# Patient Record
Sex: MALE | Race: BLACK OR AFRICAN AMERICAN
[De-identification: names, ages, dates, MRNs, and addresses within clinical notes are randomized per-mention and may not be internally consistent; named-entity substitution may affect disease eponyms.]

---

## 2017-11-09 NOTE — PD
HPI


Chief Complaint:   Complaint


Time Seen by Provider:  15:29


Travel History


International Travel<30 days:  No


Contact w/Intl Traveler<30days:  No


Traveled to known affect area:  No





History of Present Illness


HPI


31-year-old male presents to emergency room with wounds on his penis for 

approximately 2 weeks.  States that he had unprotected oral sex approximately 2 

weeks ago and started developing these lesions.  States one lesion has healed 

however, there is another sore that is open and painful in the anterior portion 

of his penile shaft that has been present for approximately one week.  States 

he is also having some right groin pain with a lump.  Patient has one partner 

who was not a sex worker.  Patient knows his partner well.  Patient denies 

history of STDs previously.  Denies pain with ejaculation or urination.  Denies 

testicular pain.  Denies penile discharge.


Patient denies fever, chills, radiation of pain, chest pain, shortness of breath

, abdominal pain.  Patient denies any medical issues or chronic medication use.





PFSH


Social History


Tobacco Use:  No





Allergies-Medications


(Allergen,Severity, Reaction):  


Coded Allergies:  


     No Known Allergies (Unverified , 11/9/17)


Reported Meds & Prescriptions





Reported Meds & Active Scripts


Active


Acyclovir 400 Mg Tab 400 Mg PO TID 7 Days








Review of Systems


Except as stated in HPI:  all other systems reviewed are Neg





Physical Exam


Narrative


GENERAL: Well-nourished, well-developed patient.


SKIN: Focused skin assessment warm/dry.


HEAD: Normocephalic.


EYES: No scleral icterus. No injection or drainage. 


NECK: Supple, trachea midline. No JVD or lymphadenopathy.


CARDIOVASCULAR: Regular rate and rhythm without murmurs, gallops, or rubs. 


RESPIRATORY: Breath sounds equal bilaterally. No accessory muscle use.


GASTROINTESTINAL: Abdomen soft, non-tender, nondistended. 


-  (examined with chaperone in the room)1cm round ulcerated on anterior 

penile shaft, 3mm healing papule just proximal to larger ulcer. No exudate or 

discharge from penis or ulcers.Testicles non edematous. BL groin with mild 

lymphadenopathy, TTP to right groin.


MUSCULOSKELETAL: No cyanosis, or edema. 


BACK: Nontender without obvious deformity. No CVA tenderness.





Data


Data


Last Documented VS





Vital Signs








  Date Time  Temp Pulse Resp B/P (MAP) Pulse Ox O2 Delivery O2 Flow Rate FiO2


 


11/9/17 14:05 98.2 113 16 149/73 (98) 99 Room Air  








Orders





 Orders


Ua Includes Microscopic (11/9/17 14:21)


Gc And Chlamydia Pcr (11/9/17 14:21)


Rpr With Reflex To Fta Abs (11/9/17 15:42)


Wound Culture And Gram Stain (11/9/17 15:46)


Penicillin G Benzathine Inj (Bicillin L- (11/9/17 16:00)


Ed Discharge Order (11/9/17 15:56)





Labs





Laboratory Tests








Test


  11/9/17


14:20 11/9/17


15:25 11/9/17


15:45


 


Urine Color YELLOW   


 


Urine Turbidity CLEAR   


 


Urine pH 6.5   


 


Urine Specific Gravity 1.025   


 


Urine Protein TRACE mg/dL   


 


Urine Glucose (UA) NEG mg/dL   


 


Urine Ketones NEG mg/dL   


 


Urine Occult Blood NEG   


 


Urine Nitrite NEG   


 


Urine Bilirubin NEG   


 


Urine Urobilinogen 2.0 MG/DL   


 


Urine Leukocyte Esterase NEG   


 


Urine RBC 1 /hpf   


 


Urine WBC 1 /hpf   


 


Urine Mucus FEW /lpf   











MDM


Medical Decision Making


Medical Screen Exam Complete:  Yes


Emergency Medical Condition:  Yes


Differential Diagnosis


penile ulcer vs syphilis vs genital herpes


Narrative Course


31-year-old male presents to emergency room with wounds on his penis for 

approximately 2 weeks.  States that he had unprotected oral sex approximately 2 

weeks ago and started developing these lesions.  States one lesion has healed 

however, there is another sore that is open and painful in the anterior portion 

of his penile shaft that has been present for approximately one week.  States 

he is also having some right groin pain with a lump.  Patient has one partner 

who was not a sex worker.  Patient knows his partner well.  Patient denies 

history of STDs previously.  Denies pain with ejaculation or urination.  Denies 

testicular pain.  Denies penile discharge.


Patient denies fever, chills, radiation of pain, chest pain, shortness of breath

, abdominal pain.  Patient denies any medical issues or chronic medication use.


Physical exam demonstrates a 1 cm round painful ulcer of the anterior penal 

shaft.  No penile discharge or exudate.  Right groin lymphadenopathy.  No 

testicular involvement, edema or TTP


Treat prophylactically for syphilis.  Culture sent.


Although not likely based off of signs and symptoms, we'll treat for genital 

herpes as well.  Patient in Band-Aid over the ulcer and I am concerned that 

this is irritated the lesion.  Concerned about compliance with follow-up.


Patient strongly advised to follow-up with the health department or primary 

care physician for further evaluation including testing of HIV.


Return to the ED for worsening symptoms





Diagnosis





 Primary Impression:  


 Penile ulcer


Referrals:  


[facility]





***Additional Instructions:  


Follow-up with the health Department within 2 days.


Take medications as prescribed


If her symptoms worsen or persists return to the emergency department


Avoid penile contact for at least one week.


Use condoms when sexually active


Medications as prescribed


Scripts


Acyclovir (Acyclovir) 400 Mg Tab


400 MG PO TID for Mgmt Viral Infection for 7 Days, TAB 0 Refills


   Prov: Forrest Paris MD         11/9/17


Disposition:  01 DISCHARGE HOME


Condition:  Stable











Pricila Smith Nov 9, 2017 15:41

## 2018-05-16 ENCOUNTER — HOSPITAL ENCOUNTER (EMERGENCY)
Dept: HOSPITAL 17 - NEPD | Age: 33
LOS: 1 days | Discharge: HOME | End: 2018-05-17
Payer: SELF-PAY

## 2018-05-16 VITALS
HEART RATE: 98 BPM | OXYGEN SATURATION: 99 % | DIASTOLIC BLOOD PRESSURE: 84 MMHG | SYSTOLIC BLOOD PRESSURE: 161 MMHG | RESPIRATION RATE: 16 BRPM | TEMPERATURE: 99.1 F

## 2018-05-16 DIAGNOSIS — N48.1: Primary | ICD-10-CM

## 2018-05-16 DIAGNOSIS — N34.2: ICD-10-CM

## 2018-05-16 DIAGNOSIS — F17.210: ICD-10-CM

## 2018-05-16 LAB
COLOR UR: YELLOW
GLUCOSE UR STRIP-MCNC: (no result) MG/DL
HGB UR QL STRIP: (no result)
KETONES UR STRIP-MCNC: (no result) MG/DL
MUCOUS THREADS #/AREA URNS LPF: (no result) /LPF
NITRITE UR QL STRIP: (no result)
SP GR UR STRIP: 1.02 (ref 1–1.03)
URINE LEUKOCYTE ESTERASE: (no result)

## 2018-05-16 PROCEDURE — 87591 N.GONORRHOEAE DNA AMP PROB: CPT

## 2018-05-16 PROCEDURE — 87491 CHLMYD TRACH DNA AMP PROBE: CPT

## 2018-05-16 PROCEDURE — 81001 URINALYSIS AUTO W/SCOPE: CPT

## 2018-05-16 PROCEDURE — 99283 EMERGENCY DEPT VISIT LOW MDM: CPT

## 2018-05-16 PROCEDURE — 96372 THER/PROPH/DIAG INJ SC/IM: CPT

## 2018-05-16 NOTE — PD
Data


Data


Last Documented VS





Vital Signs








  Date Time  Temp Pulse Resp B/P (MAP) Pulse Ox O2 Delivery O2 Flow Rate FiO2


 


5/16/18 21:24 99.1 98 16 161/84 (109) 99   








Orders





 Orders


Urinalysis - C+S If Indicated (5/16/18 22:48)


Gc And Chlamydia Pcr (5/16/18 22:48)


Ceftriaxone Inj (Rocephin Inj) (5/16/18 23:30)


Lidocaine 1% Inj (50 Ml) (Xylocaine 1% I (5/16/18 23:30)


Azithromycin (Zithromax) (5/16/18 23:30)





Labs





Laboratory Tests








Test


  5/16/18


23:03


 


Urine Color YELLOW 


 


Urine Turbidity CLEAR 


 


Urine pH 7.0 


 


Urine Specific Gravity 1.024 


 


Urine Protein TRACE mg/dL 


 


Urine Glucose (UA) NEG mg/dL 


 


Urine Ketones NEG mg/dL 


 


Urine Occult Blood NEG 


 


Urine Nitrite NEG 


 


Urine Bilirubin NEG 


 


Urine Urobilinogen 4.0 MG/DL 


 


Urine Leukocyte Esterase NEG 


 


Urine RBC


  LESS THAN 1


/hpf


 


Urine WBC 1 /hpf 


 


Urine Mucus FEW /lpf 


 


Microscopic Urinalysis Comment


  CULT NOT


INDICATED











MDM


Supervised Visit with MADALYN:  No


Narrative Course


I, Dr. Vyas, have reviewed the advance practice practitioner's documentation 

and am in agreement, met with the patient face to face, made the diagnosis, and 

the medical decision making was done by me.  See her note for further details.  

The patient prefers to have a male examiner.





Briefly this is a 32-year-old male who presents for evaluation of redness and 

irritation at the tip of his penis.  He first noticed area of irritation about 

a week ago and he states he became sensitive today.  He reports being bisexual, 

but states that he has not had any sexual activity in a little over 2 weeks.  

Patient was seen here in the past and reportedly had an ulcer on his penis that 

resolved with acyclovir.  He has not been formally diagnosed with genital 

herpes.  He has had some dysuria.  BGL here is 87.  On exam the patient is 

circumcised with some erythema and crustiness/ erythema around the distal 

urethra.  There are no ulcerative lesions.  The rest of his  exam is within 

normal limits without masses or swelling.  Testicles are nontender.  No 

inguinal lymphadenopathy.  UA shows few mucus, not suggestive of UTI.  Patient 

will be empirically treated for gonorrhea and chlamydia with IM Rocephin and 

oral azithromycin.  He will also be started on acyclovir as well as 

clotrimazole ointment.


Diagnosis





 Primary Impression:  


 Balanitis


 Additional Impression:  


 Urethritis


Referrals:  


Levar Ramos MD


3 days





Primary Care Physician


3 days





***Additional Instruction:  


Follow-up with a primary care physician this week.


Follow-up with urologist Dr. Ramos or a urologist of your choice this week.


Return to the emergency department for worsening symptoms or any other concerns.


Scripts


Clotrimazole Topical (Clotrimazole AF Topical) 1% Cream


1 APPLIC TOPICAL BID for Infection for 7 Days, #15 GM 0 Refills


   Prov: Rios Vyas MD         5/16/18 


Acyclovir (Acyclovir) 800 Mg Tab


800 MG PO 5 TIMES A DAY for Mgmt Viral Infection for 5 Days, TAB 0 Refills


   Prov: Rios Vyas MD         5/16/18


Disposition:  01 DISCHARGE HOME


Condition:  Stable











Rios Vyas MD May 16, 2018 23:38

## 2018-05-16 NOTE — PD
HPI


Chief Complaint:   Complaint


Time Seen by Provider:  22:41


Travel History


International Travel<30 days:  No


Contact w/Intl Traveler<30days:  No


Traveled to known affect area:  No





History of Present Illness


HPI


Patient is a 32-year-old male presenting to the emergency department for 

evaluation of irritation to the tip of his penis.  Patient states this started 

1 week ago.  He has had sex in the last month but denies any unprotected sex.  

He denies any dysuria, fever, chills, abdominal pain.  He states the tip of his 

penis is swollen.  Pain is worse when it touches anything or he touches it.  

Symptom onset was gradual, symptoms are moderate to severe nature.  There are 

no alleviating factors.  Patient reports the pain is a 7 out of 10.





PFSH


Past Medical History


Medical History:  Denies Significant Hx


Diminished Hearing:  No


Immunizations Current:  Yes





Past Surgical History


Other Surgery:  Yes (ABSCESS)





Social History


Alcohol Use:  Yes


Tobacco Use:  Yes (1/2PPD)


Substance Use:  No





Allergies-Medications


(Allergen,Severity, Reaction):  


Coded Allergies:  


     No Known Allergies (Unverified , 11/9/17)


Reported Meds & Prescriptions





Reported Meds & Active Scripts


Active


Clotrimazole AF Topical (Clotrimazole) 1% Cream 1 Applic TOPICAL BID 7 Days


Acyclovir 800 Mg Tab 800 Mg PO 5 TIMES A DAY 5 Days


Acyclovir 400 Mg Tab 400 Mg PO TID 7 Days








Review of Systems


Except as stated in HPI:  all other systems reviewed are Neg


Genitourinary:  Positive: Other (Penis pain)





Physical Exam


Narrative


GENERAL: Well developed, well-nourished, alert -American male.  

Presenting in no acute distress.


SKIN: Warm and dry.


HEAD: Normocephalic.


EYES: No scleral icterus. No injection or drainage. 


NECK: Supple, trachea midline. No JVD or lymphadenopathy.


CARDIOVASCULAR: Regular rate and rhythm without murmurs, gallops, or rubs. 


RESPIRATORY: Breath sounds equal bilaterally. No accessory muscle use.


GASTROINTESTINAL: Abdomen soft, non-tender, nondistended. 


MUSCULOSKELETAL: No cyanosis, or edema. 


BACK: Nontender without obvious deformity. No CVA tenderness.








Data


Data


Last Documented VS





Vital Signs








  Date Time  Temp Pulse Resp B/P (MAP) Pulse Ox O2 Delivery O2 Flow Rate FiO2


 


5/16/18 21:24 99.1 98 16 161/84 (109) 99   








Orders





 Orders


Urinalysis - C+S If Indicated (5/16/18 22:48)


Gc And Chlamydia Pcr (5/16/18 22:48)


Ceftriaxone Inj (Rocephin Inj) (5/16/18 23:30)


Lidocaine 1% Inj (50 Ml) (Xylocaine 1% I (5/16/18 23:30)


Azithromycin (Zithromax) (5/16/18 23:30)


Ed Discharge Order (5/16/18 23:38)





Labs





Laboratory Tests








Test


  5/16/18


23:03


 


Urine Color YELLOW 


 


Urine Turbidity CLEAR 


 


Urine pH 7.0 


 


Urine Specific Gravity 1.024 


 


Urine Protein TRACE mg/dL 


 


Urine Glucose (UA) NEG mg/dL 


 


Urine Ketones NEG mg/dL 


 


Urine Occult Blood NEG 


 


Urine Nitrite NEG 


 


Urine Bilirubin NEG 


 


Urine Urobilinogen 4.0 MG/DL 


 


Urine Leukocyte Esterase NEG 


 


Urine RBC


  LESS THAN 1


/hpf


 


Urine WBC 1 /hpf 


 


Urine Mucus FEW /lpf 


 


Microscopic Urinalysis Comment


  CULT NOT


INDICATED











MDM


Medical Decision Making


Medical Screen Exam Complete:  Yes


Emergency Medical Condition:  Yes


Interpretation(s)





Vital Signs








  Date Time  Temp Pulse Resp B/P (MAP) Pulse Ox O2 Delivery O2 Flow Rate FiO2


 


5/16/18 21:24 99.1 98 16 161/84 (109) 99   








Differential Diagnosis


UTI versus STD versus bronchitis versus epididymitis versus other


Narrative Course


Patient is a 31-year-old male presenting to emerge from for evaluation of edema 

to the tip of his penis.  Patient's vital signs are stable.  Patient is 

requesting a male physician examine him.  Care of patient transferred to my 

attending physician who will determine his disposition.





Diagnosis





 Primary Impression:  


 Balanitis


Referrals:  


[facility]





Primary Care Physician


Patient Instructions:  Balanitis (GEN), General Instructions


***Med/Other Pt SpecificInfo:  Prescription(s) given


Scripts


Clotrimazole Topical (Clotrimazole AF Topical) 1% Cream


1 APPLIC TOPICAL BID for Infection for 7 Days, #15 GM 0 Refills


   Prov: Rios Vyas MD         5/16/18 


Acyclovir (Acyclovir) 800 Mg Tab


800 MG PO 5 TIMES A DAY for Mgmt Viral Infection for 5 Days, TAB 0 Refills


   Prov: Rios Vyas MD         5/16/18











[facility] Gaines [facility] May 16, 2018 22:52